# Patient Record
Sex: FEMALE | Race: WHITE | NOT HISPANIC OR LATINO | Employment: FULL TIME | URBAN - METROPOLITAN AREA
[De-identification: names, ages, dates, MRNs, and addresses within clinical notes are randomized per-mention and may not be internally consistent; named-entity substitution may affect disease eponyms.]

---

## 2023-09-05 ENCOUNTER — OFFICE VISIT (OUTPATIENT)
Dept: PRIMARY CARE | Facility: CLINIC | Age: 53
End: 2023-09-05
Payer: COMMERCIAL

## 2023-09-05 VITALS
SYSTOLIC BLOOD PRESSURE: 106 MMHG | OXYGEN SATURATION: 97 % | HEIGHT: 64 IN | TEMPERATURE: 98 F | HEART RATE: 87 BPM | BODY MASS INDEX: 21.24 KG/M2 | WEIGHT: 124.4 LBS | RESPIRATION RATE: 16 BRPM | DIASTOLIC BLOOD PRESSURE: 60 MMHG

## 2023-09-05 DIAGNOSIS — M19.90 ARTHRITIS: ICD-10-CM

## 2023-09-05 DIAGNOSIS — Z00.00 ANNUAL PHYSICAL EXAM: Primary | ICD-10-CM

## 2023-09-05 PROBLEM — R92.30 DENSE BREAST TISSUE: Status: ACTIVE | Noted: 2023-09-05

## 2023-09-05 PROBLEM — E55.9 VITAMIN D DEFICIENCY: Status: RESOLVED | Noted: 2023-09-05 | Resolved: 2023-09-05

## 2023-09-05 PROBLEM — N95.1 MENOPAUSAL SYMPTOMS: Status: RESOLVED | Noted: 2023-09-05 | Resolved: 2023-09-05

## 2023-09-05 PROBLEM — R35.0 URINARY FREQUENCY: Status: RESOLVED | Noted: 2023-09-05 | Resolved: 2023-09-05

## 2023-09-05 PROBLEM — K21.9 GASTROESOPHAGEAL REFLUX DISEASE: Status: ACTIVE | Noted: 2023-09-05

## 2023-09-05 PROBLEM — E55.9 VITAMIN D DEFICIENCY: Status: ACTIVE | Noted: 2023-09-05

## 2023-09-05 PROBLEM — R35.0 URINARY FREQUENCY: Status: ACTIVE | Noted: 2023-09-05

## 2023-09-05 PROBLEM — M16.10 HIP ARTHRITIS: Status: ACTIVE | Noted: 2023-09-05

## 2023-09-05 PROBLEM — G47.00 INSOMNIA: Status: ACTIVE | Noted: 2023-09-05

## 2023-09-05 PROBLEM — R92.30 DENSE BREAST TISSUE: Status: RESOLVED | Noted: 2023-09-05 | Resolved: 2023-09-05

## 2023-09-05 PROBLEM — N95.1 MENOPAUSAL SYMPTOMS: Status: ACTIVE | Noted: 2023-09-05

## 2023-09-05 PROCEDURE — 1036F TOBACCO NON-USER: CPT | Performed by: FAMILY MEDICINE

## 2023-09-05 PROCEDURE — 99396 PREV VISIT EST AGE 40-64: CPT | Performed by: FAMILY MEDICINE

## 2023-09-05 RX ORDER — MELOXICAM 15 MG/1
15 TABLET ORAL DAILY
COMMUNITY
End: 2023-09-05 | Stop reason: SDUPTHER

## 2023-09-05 RX ORDER — CALC/MAG/B COMPLEX/D3/HERB 61
1 TABLET ORAL DAILY
COMMUNITY

## 2023-09-05 RX ORDER — MELOXICAM 15 MG/1
15 TABLET ORAL DAILY
Qty: 90 TABLET | Refills: 0 | Status: SHIPPED | OUTPATIENT
Start: 2023-09-05

## 2023-09-05 NOTE — ASSESSMENT & PLAN NOTE
Schedule fasting labs and mammogram.  Colonoscopy up-to-date  Biometric form completed.  Immunizations up-to-date, recommending shingles vaccine.  Continue regular physical activity  Decrease sugar intake

## 2023-09-05 NOTE — PROGRESS NOTES
"Subjective   Patient ID: Brandon Lara is a 53 y.o. female who presents for Annual Exam.    Here for CPE    Pap 8/20  Mammogram 7/22  Colonoscopy 4/20 recheck in 10    Diet is horrible ??  Likes candy and sweets  Limits fast foods and fried foods  Eats fruits and veggies  Not good with dairy in general   Walking at work    No tobacco, no etoh  Brushing and flossing, seeing dentist  Vision checks yearly  Menses over  Menopausal sx, hot flashes and mood swings.             Review of Systems    Objective   /60   Pulse 87   Temp 36.7 °C (98 °F)   Resp 16   Ht 1.613 m (5' 3.5\")   Wt 56.4 kg (124 lb 6.4 oz)   SpO2 97%   BMI 21.69 kg/m²     Physical Exam  Vitals and nursing note reviewed.   Constitutional:       Appearance: Normal appearance.   HENT:      Head: Normocephalic.      Right Ear: Tympanic membrane normal.      Left Ear: Tympanic membrane normal.      Nose: Nose normal.      Mouth/Throat:      Mouth: Mucous membranes are dry.   Eyes:      Extraocular Movements: Extraocular movements intact.      Pupils: Pupils are equal, round, and reactive to light.   Cardiovascular:      Rate and Rhythm: Normal rate and regular rhythm.      Pulses: Normal pulses.   Pulmonary:      Effort: Pulmonary effort is normal.      Breath sounds: Normal breath sounds.   Abdominal:      General: Abdomen is flat. Bowel sounds are normal.      Palpations: Abdomen is soft.   Musculoskeletal:         General: Normal range of motion.      Cervical back: Normal range of motion.   Skin:     General: Skin is warm and dry.   Neurological:      General: No focal deficit present.      Mental Status: She is alert and oriented to person, place, and time.   Psychiatric:         Mood and Affect: Mood normal.         Behavior: Behavior normal.         Thought Content: Thought content normal.         Judgment: Judgment normal.       Assessment/Plan   Problem List Items Addressed This Visit       Arthritis     Refilling meloxicam for as needed " use         Relevant Medications    meloxicam (Mobic) 15 mg tablet    Annual physical exam - Primary     Schedule fasting labs and mammogram.  Colonoscopy up-to-date  Biometric form completed.  Immunizations up-to-date, recommending shingles vaccine.  Continue regular physical activity  Decrease sugar intake           Relevant Orders    CBC and Auto Differential    Comprehensive Metabolic Panel    Lipid Panel

## 2023-09-08 ENCOUNTER — LAB (OUTPATIENT)
Dept: LAB | Facility: LAB | Age: 53
End: 2023-09-08
Payer: COMMERCIAL

## 2023-09-08 DIAGNOSIS — Z00.00 ANNUAL PHYSICAL EXAM: ICD-10-CM

## 2023-09-08 LAB
ALANINE AMINOTRANSFERASE (SGPT) (U/L) IN SER/PLAS: 13 U/L (ref 7–45)
ALBUMIN (G/DL) IN SER/PLAS: 4.6 G/DL (ref 3.4–5)
ALKALINE PHOSPHATASE (U/L) IN SER/PLAS: 80 U/L (ref 33–110)
ANION GAP IN SER/PLAS: 12 MMOL/L (ref 10–20)
ASPARTATE AMINOTRANSFERASE (SGOT) (U/L) IN SER/PLAS: 19 U/L (ref 9–39)
BASOPHILS (10*3/UL) IN BLOOD BY AUTOMATED COUNT: 0.05 X10E9/L (ref 0–0.1)
BASOPHILS/100 LEUKOCYTES IN BLOOD BY AUTOMATED COUNT: 0.7 % (ref 0–2)
BILIRUBIN TOTAL (MG/DL) IN SER/PLAS: 0.5 MG/DL (ref 0–1.2)
CALCIUM (MG/DL) IN SER/PLAS: 8.8 MG/DL (ref 8.6–10.3)
CARBON DIOXIDE, TOTAL (MMOL/L) IN SER/PLAS: 29 MMOL/L (ref 21–32)
CHLORIDE (MMOL/L) IN SER/PLAS: 101 MMOL/L (ref 98–107)
CHOLESTEROL (MG/DL) IN SER/PLAS: 197 MG/DL (ref 0–199)
CHOLESTEROL IN HDL (MG/DL) IN SER/PLAS: 73.2 MG/DL
CHOLESTEROL/HDL RATIO: 2.7
CREATININE (MG/DL) IN SER/PLAS: 0.75 MG/DL (ref 0.5–1.05)
EOSINOPHILS (10*3/UL) IN BLOOD BY AUTOMATED COUNT: 0.08 X10E9/L (ref 0–0.7)
EOSINOPHILS/100 LEUKOCYTES IN BLOOD BY AUTOMATED COUNT: 1.1 % (ref 0–6)
ERYTHROCYTE DISTRIBUTION WIDTH (RATIO) BY AUTOMATED COUNT: 12.9 % (ref 11.5–14.5)
ERYTHROCYTE MEAN CORPUSCULAR HEMOGLOBIN CONCENTRATION (G/DL) BY AUTOMATED: 33.1 G/DL (ref 32–36)
ERYTHROCYTE MEAN CORPUSCULAR VOLUME (FL) BY AUTOMATED COUNT: 82 FL (ref 80–100)
ERYTHROCYTES (10*6/UL) IN BLOOD BY AUTOMATED COUNT: 5.73 X10E12/L (ref 4–5.2)
GFR FEMALE: >90 ML/MIN/1.73M2
GLUCOSE (MG/DL) IN SER/PLAS: 74 MG/DL (ref 74–99)
HEMATOCRIT (%) IN BLOOD BY AUTOMATED COUNT: 47.1 % (ref 36–46)
HEMOGLOBIN (G/DL) IN BLOOD: 15.6 G/DL (ref 12–16)
IMMATURE GRANULOCYTES/100 LEUKOCYTES IN BLOOD BY AUTOMATED COUNT: 0.1 % (ref 0–0.9)
LDL: 108 MG/DL (ref 0–99)
LEUKOCYTES (10*3/UL) IN BLOOD BY AUTOMATED COUNT: 7.6 X10E9/L (ref 4.4–11.3)
LYMPHOCYTES (10*3/UL) IN BLOOD BY AUTOMATED COUNT: 2.16 X10E9/L (ref 1.2–4.8)
LYMPHOCYTES/100 LEUKOCYTES IN BLOOD BY AUTOMATED COUNT: 28.6 % (ref 13–44)
MONOCYTES (10*3/UL) IN BLOOD BY AUTOMATED COUNT: 0.51 X10E9/L (ref 0.1–1)
MONOCYTES/100 LEUKOCYTES IN BLOOD BY AUTOMATED COUNT: 6.8 % (ref 2–10)
NEUTROPHILS (10*3/UL) IN BLOOD BY AUTOMATED COUNT: 4.74 X10E9/L (ref 1.2–7.7)
NEUTROPHILS/100 LEUKOCYTES IN BLOOD BY AUTOMATED COUNT: 62.7 % (ref 40–80)
PLATELETS (10*3/UL) IN BLOOD AUTOMATED COUNT: 254 X10E9/L (ref 150–450)
POTASSIUM (MMOL/L) IN SER/PLAS: 4 MMOL/L (ref 3.5–5.3)
PROTEIN TOTAL: 7.1 G/DL (ref 6.4–8.2)
SODIUM (MMOL/L) IN SER/PLAS: 138 MMOL/L (ref 136–145)
TRIGLYCERIDE (MG/DL) IN SER/PLAS: 81 MG/DL (ref 0–149)
UREA NITROGEN (MG/DL) IN SER/PLAS: 16 MG/DL (ref 6–23)
VLDL: 16 MG/DL (ref 0–40)

## 2023-09-08 PROCEDURE — 80061 LIPID PANEL: CPT

## 2023-09-08 PROCEDURE — 36415 COLL VENOUS BLD VENIPUNCTURE: CPT

## 2023-09-08 PROCEDURE — 80053 COMPREHEN METABOLIC PANEL: CPT

## 2023-09-08 PROCEDURE — 85025 COMPLETE CBC W/AUTO DIFF WBC: CPT

## 2024-09-17 ENCOUNTER — APPOINTMENT (OUTPATIENT)
Dept: OBSTETRICS AND GYNECOLOGY | Facility: CLINIC | Age: 54
End: 2024-09-17
Payer: COMMERCIAL

## 2024-09-17 VITALS — SYSTOLIC BLOOD PRESSURE: 142 MMHG | WEIGHT: 128 LBS | DIASTOLIC BLOOD PRESSURE: 85 MMHG | BODY MASS INDEX: 22.32 KG/M2

## 2024-09-17 DIAGNOSIS — N95.8 GENITOURINARY SYNDROME OF MENOPAUSE: ICD-10-CM

## 2024-09-17 DIAGNOSIS — Z01.419 ENCOUNTER FOR GYNECOLOGICAL EXAMINATION WITHOUT ABNORMAL FINDING: Primary | ICD-10-CM

## 2024-09-17 PROCEDURE — 1036F TOBACCO NON-USER: CPT | Performed by: OBSTETRICS & GYNECOLOGY

## 2024-09-17 PROCEDURE — 99396 PREV VISIT EST AGE 40-64: CPT | Performed by: OBSTETRICS & GYNECOLOGY

## 2024-09-17 RX ORDER — ESTRADIOL 0.1 MG/G
0.5 CREAM VAGINAL 3 TIMES WEEKLY
Qty: 42.5 G | Refills: 3 | Status: SHIPPED | OUTPATIENT
Start: 2024-09-18

## 2024-09-17 RX ORDER — ESTRADIOL 0.1 MG/G
2 CREAM VAGINAL DAILY
COMMUNITY
End: 2024-09-17 | Stop reason: SDUPTHER

## 2024-09-17 NOTE — PROGRESS NOTES
Subjective   Brandon Lara is a 54 y.o. female here for a routine exam.  She has been menopausal since her late 40s.  She has noted more recently a decrease in libido.  She was using estradiol cream, which was helpful for vaginal dryness, but then discontinued when she had some left breast sensitivity.  She finds that the dryness has recurred and wants to begin the cream again.  She has no postmenopausal bleeding or pelvic pain.  No dysuria, no change in bowel habits or vaginal discharge.    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  Personal health questionnaire reviewed: yes.     Gynecologic History  No LMP recorded. Patient is postmenopausal.  Contraception: post menopausal status  Last Pap: 23. Results were: normal  Last mammogram: 23. Results were: normal    Obstetric History  OB History    Para Term  AB Living   2 2           SAB IAB Ectopic Multiple Live Births                  # Outcome Date GA Lbr Shiva/2nd Weight Sex Type Anes PTL Lv   2 Para            1 Para                Objective   Constitutional: Alert and in no acute distress. Well developed, well nourished.   Head and Face: Head and face: Normal.    Eyes: Normal external exam - nonicteric sclera, extraocular movements intact (EOMI) and no ptosis.   Neck: No neck asymmetry. Supple. Thyroid not enlarged and there were no palpable thyroid nodules.    Pulmonary: No respiratory distress.   Chest: Breasts: Normal appearance, no nipple discharge and no skin changes. Palpation of breasts and axillae: No palpable mass and no axillary lymphadenopathy.   Abdomen: Soft nontender; no abdominal mass palpated. No organomegaly. No hernias.   Genitourinary: External genitalia: Normal. No inguinal lymphadenopathy. Bartholin's Urethral and Skenes Glands: Normal. Urethra: Normal.  Bladder: Normal on palpation. Vagina: Normal. Cervix: Normal.  Uterus: Normal.  Right Adnexa/parametria: Normal.  Left Adnexa/parametria:  Normal.  Inspection of Perianal Area: Normal.   Musculoskeletal: No joint swelling seen, normal movements of all extremities.   Skin: Normal skin color and pigmentation, normal skin turgor, and no rash.   Neurologic: Non-focal. Grossly intact.   Psychiatric: Alert and oriented x 3. Affect normal to patient baseline. Mood: Appropriate.  Physical Exam     Assessment/Plan   Healthy female exam.  This is a 54-year-old female with a normal exam.  No Pap was sent, she is high risk HPV negative in 2023.    Her routine mammogram was ordered with tomosynthesis.    We discussed resuming estradiol cream, begin by using a pea-sized amount at the vaginal opening every night for 2 weeks, then 3 times weekly thereafter.  It could take 8 to 12 weeks for the full effect.    [facility].  I did briefly discuss the Sexual Medicine department if needed.    I will see her routinely in 1 year.  Mammogram ordered.

## 2024-10-22 ENCOUNTER — APPOINTMENT (OUTPATIENT)
Dept: PRIMARY CARE | Facility: CLINIC | Age: 54
End: 2024-10-22
Payer: COMMERCIAL

## 2024-10-22 VITALS
SYSTOLIC BLOOD PRESSURE: 118 MMHG | OXYGEN SATURATION: 96 % | HEART RATE: 58 BPM | TEMPERATURE: 98 F | BODY MASS INDEX: 22.02 KG/M2 | HEIGHT: 64 IN | DIASTOLIC BLOOD PRESSURE: 68 MMHG | WEIGHT: 129 LBS

## 2024-10-22 DIAGNOSIS — M19.90 ARTHRITIS: ICD-10-CM

## 2024-10-22 DIAGNOSIS — Z00.00 ANNUAL PHYSICAL EXAM: Primary | ICD-10-CM

## 2024-10-22 DIAGNOSIS — R53.83 OTHER FATIGUE: ICD-10-CM

## 2024-10-22 DIAGNOSIS — E78.5 BORDERLINE HYPERLIPIDEMIA: ICD-10-CM

## 2024-10-22 PROCEDURE — 3008F BODY MASS INDEX DOCD: CPT | Performed by: FAMILY MEDICINE

## 2024-10-22 PROCEDURE — 99396 PREV VISIT EST AGE 40-64: CPT | Performed by: FAMILY MEDICINE

## 2024-10-22 PROCEDURE — 1036F TOBACCO NON-USER: CPT | Performed by: FAMILY MEDICINE

## 2024-10-22 RX ORDER — MELOXICAM 15 MG/1
15 TABLET ORAL DAILY
Qty: 90 TABLET | Refills: 2 | Status: SHIPPED | OUTPATIENT
Start: 2024-10-22

## 2024-10-22 ASSESSMENT — PROMIS GLOBAL HEALTH SCALE
RATE_QUALITY_OF_LIFE: GOOD
EMOTIONAL_PROBLEMS: SOMETIMES
CARRYOUT_SOCIAL_ACTIVITIES: GOOD
RATE_AVERAGE_FATIGUE: MODERATE
RATE_PHYSICAL_HEALTH: GOOD
CARRYOUT_PHYSICAL_ACTIVITIES: COMPLETELY
RATE_AVERAGE_PAIN: 1
RATE_SOCIAL_SATISFACTION: GOOD
RATE_MENTAL_HEALTH: GOOD
RATE_GENERAL_HEALTH: GOOD

## 2024-10-22 ASSESSMENT — ENCOUNTER SYMPTOMS
OCCASIONAL FEELINGS OF UNSTEADINESS: 0
DEPRESSION: 0
LOSS OF SENSATION IN FEET: 0

## 2024-10-22 ASSESSMENT — PAIN SCALES - GENERAL: PAINLEVEL_OUTOF10: 0-NO PAIN

## 2024-10-22 NOTE — PROGRESS NOTES
"Subjective   Patient ID: Brandon Lara is a 54 y.o. female who presents for Annual Exam.    Here for Cpe   Needs biometric form completed.     Doing well.   Exercise hiking, walking  No strength training  Diet is good, trys to limit sugar  Sleeping ok  Dental exams regular   Eye doc yearly      Seeing gyn for pap's  Mammogram scheduled.      PAP;GYN- 09- nl   MAMM- 09- nl   Colon- 04/22 recheck 10yrs           Review of Systems    Objective   /68 (BP Location: Left arm, Patient Position: Sitting, BP Cuff Size: Adult)   Pulse 58   Temp 36.7 °C (98 °F) (Temporal)   Ht 1.626 m (5' 4\")   Wt 58.5 kg (129 lb)   SpO2 96%   BMI 22.14 kg/m²     Physical Exam  Vitals and nursing note reviewed.   Constitutional:       Appearance: Normal appearance.   HENT:      Head: Normocephalic.      Right Ear: Tympanic membrane normal.      Left Ear: Tympanic membrane normal.      Nose: Nose normal.      Mouth/Throat:      Mouth: Mucous membranes are dry.   Eyes:      Extraocular Movements: Extraocular movements intact.      Pupils: Pupils are equal, round, and reactive to light.   Cardiovascular:      Rate and Rhythm: Normal rate and regular rhythm.      Pulses: Normal pulses.   Pulmonary:      Effort: Pulmonary effort is normal.      Breath sounds: Normal breath sounds.   Abdominal:      General: Abdomen is flat. Bowel sounds are normal.      Palpations: Abdomen is soft.   Musculoskeletal:         General: Normal range of motion.      Cervical back: Normal range of motion.   Skin:     General: Skin is warm and dry.   Neurological:      General: No focal deficit present.      Mental Status: She is alert and oriented to person, place, and time.   Psychiatric:         Mood and Affect: Mood normal.         Behavior: Behavior normal.         Thought Content: Thought content normal.         Judgment: Judgment normal.         Assessment/Plan   Problem List Items Addressed This Visit             ICD-10-CM    Arthritis " M19.90     Stable  Refilling meds for as needed use         Relevant Medications    meloxicam (Mobic) 15 mg tablet    Annual physical exam - Primary Z00.00     Schedule fasting labs and mammogram  Pap and colonoscopy are UTD  Immunizations are UTD           Relevant Orders    CBC and Auto Differential    Comprehensive Metabolic Panel    Lipid Panel    Borderline hyperlipidemia E78.5     Checking labs and treat accordingly  Reviewed diet  Recheck in 1 year         Other fatigue R53.83     Checking vitamin levels and thyroid and treat accordingly         Relevant Orders    TSH with reflex to Free T4 if abnormal    Vitamin D 1,25 Dihydroxy (for eval of hypercalcemia)    Vitamin B12    Folate

## 2024-10-23 ENCOUNTER — LAB (OUTPATIENT)
Dept: LAB | Facility: LAB | Age: 54
End: 2024-10-23
Payer: COMMERCIAL

## 2024-10-23 DIAGNOSIS — Z00.00 ANNUAL PHYSICAL EXAM: ICD-10-CM

## 2024-10-23 DIAGNOSIS — R53.83 OTHER FATIGUE: ICD-10-CM

## 2024-10-23 LAB
ALBUMIN SERPL BCP-MCNC: 4.5 G/DL (ref 3.4–5)
ALP SERPL-CCNC: 81 U/L (ref 33–110)
ALT SERPL W P-5'-P-CCNC: 12 U/L (ref 7–45)
ANION GAP SERPL CALC-SCNC: 12 MMOL/L (ref 10–20)
AST SERPL W P-5'-P-CCNC: 15 U/L (ref 9–39)
BASOPHILS # BLD AUTO: 0.06 X10*3/UL (ref 0–0.1)
BASOPHILS NFR BLD AUTO: 1 %
BILIRUB SERPL-MCNC: 0.5 MG/DL (ref 0–1.2)
BUN SERPL-MCNC: 12 MG/DL (ref 6–23)
CALCIUM SERPL-MCNC: 9.7 MG/DL (ref 8.6–10.6)
CHLORIDE SERPL-SCNC: 104 MMOL/L (ref 98–107)
CHOLEST SERPL-MCNC: 209 MG/DL (ref 0–199)
CHOLESTEROL/HDL RATIO: 3.1
CO2 SERPL-SCNC: 31 MMOL/L (ref 21–32)
CREAT SERPL-MCNC: 0.66 MG/DL (ref 0.5–1.05)
EGFRCR SERPLBLD CKD-EPI 2021: >90 ML/MIN/1.73M*2
EOSINOPHIL # BLD AUTO: 0.12 X10*3/UL (ref 0–0.7)
EOSINOPHIL NFR BLD AUTO: 2.1 %
ERYTHROCYTE [DISTWIDTH] IN BLOOD BY AUTOMATED COUNT: 12.2 % (ref 11.5–14.5)
FOLATE SERPL-MCNC: 16.4 NG/ML
GLUCOSE SERPL-MCNC: 77 MG/DL (ref 74–99)
HCT VFR BLD AUTO: 48.2 % (ref 36–46)
HDLC SERPL-MCNC: 67.9 MG/DL
HGB BLD-MCNC: 15.5 G/DL (ref 12–16)
IMM GRANULOCYTES # BLD AUTO: 0.01 X10*3/UL (ref 0–0.7)
IMM GRANULOCYTES NFR BLD AUTO: 0.2 % (ref 0–0.9)
LDLC SERPL CALC-MCNC: 124 MG/DL
LYMPHOCYTES # BLD AUTO: 1.63 X10*3/UL (ref 1.2–4.8)
LYMPHOCYTES NFR BLD AUTO: 28 %
MCH RBC QN AUTO: 27.1 PG (ref 26–34)
MCHC RBC AUTO-ENTMCNC: 32.2 G/DL (ref 32–36)
MCV RBC AUTO: 84 FL (ref 80–100)
MONOCYTES # BLD AUTO: 0.48 X10*3/UL (ref 0.1–1)
MONOCYTES NFR BLD AUTO: 8.2 %
NEUTROPHILS # BLD AUTO: 3.52 X10*3/UL (ref 1.2–7.7)
NEUTROPHILS NFR BLD AUTO: 60.5 %
NON HDL CHOLESTEROL: 141 MG/DL (ref 0–149)
NRBC BLD-RTO: 0 /100 WBCS (ref 0–0)
PLATELET # BLD AUTO: 240 X10*3/UL (ref 150–450)
POTASSIUM SERPL-SCNC: 4.2 MMOL/L (ref 3.5–5.3)
PROT SERPL-MCNC: 6.9 G/DL (ref 6.4–8.2)
RBC # BLD AUTO: 5.72 X10*6/UL (ref 4–5.2)
SODIUM SERPL-SCNC: 143 MMOL/L (ref 136–145)
TRIGL SERPL-MCNC: 84 MG/DL (ref 0–149)
TSH SERPL-ACNC: 1.58 MIU/L (ref 0.44–3.98)
VIT B12 SERPL-MCNC: 251 PG/ML (ref 211–911)
VLDL: 17 MG/DL (ref 0–40)
WBC # BLD AUTO: 5.8 X10*3/UL (ref 4.4–11.3)

## 2024-10-23 PROCEDURE — 80061 LIPID PANEL: CPT

## 2024-10-23 PROCEDURE — 82746 ASSAY OF FOLIC ACID SERUM: CPT

## 2024-10-23 PROCEDURE — 85025 COMPLETE CBC W/AUTO DIFF WBC: CPT

## 2024-10-23 PROCEDURE — 82652 VIT D 1 25-DIHYDROXY: CPT

## 2024-10-23 PROCEDURE — 82607 VITAMIN B-12: CPT

## 2024-10-23 PROCEDURE — 84443 ASSAY THYROID STIM HORMONE: CPT

## 2024-10-23 PROCEDURE — 36415 COLL VENOUS BLD VENIPUNCTURE: CPT

## 2024-10-23 PROCEDURE — 80053 COMPREHEN METABOLIC PANEL: CPT

## 2024-10-25 LAB — 1,25(OH)2D SERPL-MCNC: 61.9 PG/ML (ref 19.9–79.3)

## 2024-11-22 ENCOUNTER — HOSPITAL ENCOUNTER (OUTPATIENT)
Dept: RADIOLOGY | Facility: CLINIC | Age: 54
Discharge: HOME | End: 2024-11-22
Payer: COMMERCIAL

## 2024-11-22 VITALS — WEIGHT: 129 LBS | HEIGHT: 64 IN | BODY MASS INDEX: 22.02 KG/M2

## 2024-11-22 DIAGNOSIS — Z01.419 ENCOUNTER FOR GYNECOLOGICAL EXAMINATION WITHOUT ABNORMAL FINDING: ICD-10-CM

## 2024-11-22 PROCEDURE — 77067 SCR MAMMO BI INCL CAD: CPT

## 2025-09-23 ENCOUNTER — APPOINTMENT (OUTPATIENT)
Dept: OBSTETRICS AND GYNECOLOGY | Facility: CLINIC | Age: 55
End: 2025-09-23
Payer: COMMERCIAL

## 2025-10-03 ENCOUNTER — APPOINTMENT (OUTPATIENT)
Dept: OBSTETRICS AND GYNECOLOGY | Facility: CLINIC | Age: 55
End: 2025-10-03
Payer: COMMERCIAL

## 2025-10-24 ENCOUNTER — APPOINTMENT (OUTPATIENT)
Dept: PRIMARY CARE | Facility: CLINIC | Age: 55
End: 2025-10-24
Payer: COMMERCIAL